# Patient Record
Sex: FEMALE | Race: WHITE | NOT HISPANIC OR LATINO | Employment: FULL TIME | URBAN - METROPOLITAN AREA
[De-identification: names, ages, dates, MRNs, and addresses within clinical notes are randomized per-mention and may not be internally consistent; named-entity substitution may affect disease eponyms.]

---

## 2017-04-16 ENCOUNTER — APPOINTMENT (OUTPATIENT)
Dept: RADIOLOGY | Facility: MEDICAL CENTER | Age: 52
End: 2017-04-16
Attending: EMERGENCY MEDICINE

## 2017-04-16 ENCOUNTER — HOSPITAL ENCOUNTER (OUTPATIENT)
Dept: RADIOLOGY | Facility: MEDICAL CENTER | Age: 52
End: 2017-04-16

## 2017-04-16 ENCOUNTER — HOSPITAL ENCOUNTER (EMERGENCY)
Facility: MEDICAL CENTER | Age: 52
End: 2017-04-16
Attending: EMERGENCY MEDICINE

## 2017-04-16 VITALS
OXYGEN SATURATION: 94 % | TEMPERATURE: 96.3 F | HEIGHT: 69 IN | WEIGHT: 280 LBS | RESPIRATION RATE: 16 BRPM | BODY MASS INDEX: 41.47 KG/M2 | HEART RATE: 100 BPM

## 2017-04-16 DIAGNOSIS — S12.200A: ICD-10-CM

## 2017-04-16 PROCEDURE — 700111 HCHG RX REV CODE 636 W/ 250 OVERRIDE (IP): Performed by: EMERGENCY MEDICINE

## 2017-04-16 PROCEDURE — 305948 HCHG GREEN TRAUMA ACT PRE-NOTIFY NO CC

## 2017-04-16 PROCEDURE — 99285 EMERGENCY DEPT VISIT HI MDM: CPT

## 2017-04-16 PROCEDURE — 96375 TX/PRO/DX INJ NEW DRUG ADDON: CPT

## 2017-04-16 PROCEDURE — 72141 MRI NECK SPINE W/O DYE: CPT

## 2017-04-16 PROCEDURE — 96374 THER/PROPH/DIAG INJ IV PUSH: CPT

## 2017-04-16 RX ORDER — ONDANSETRON 2 MG/ML
4 INJECTION INTRAMUSCULAR; INTRAVENOUS ONCE
Status: COMPLETED | OUTPATIENT
Start: 2017-04-16 | End: 2017-04-16

## 2017-04-16 RX ORDER — IBUPROFEN 600 MG/1
600 TABLET ORAL
Qty: 20 TAB | Refills: 0 | Status: SHIPPED | OUTPATIENT
Start: 2017-04-16

## 2017-04-16 RX ORDER — IBUPROFEN 200 MG
600 TABLET ORAL EVERY 6 HOURS PRN
COMMUNITY

## 2017-04-16 RX ORDER — HYDROCODONE BITARTRATE AND ACETAMINOPHEN 5; 325 MG/1; MG/1
1-2 TABLET ORAL EVERY 6 HOURS PRN
Qty: 20 TAB | Refills: 0 | Status: SHIPPED | OUTPATIENT
Start: 2017-04-16

## 2017-04-16 RX ADMIN — HYDROMORPHONE HYDROCHLORIDE 0.5 MG: 1 INJECTION, SOLUTION INTRAMUSCULAR; INTRAVENOUS; SUBCUTANEOUS at 18:56

## 2017-04-16 RX ADMIN — ONDANSETRON 4 MG: 2 INJECTION, SOLUTION INTRAMUSCULAR; INTRAVENOUS at 18:56

## 2017-04-16 ASSESSMENT — PAIN SCALES - GENERAL: PAINLEVEL_OUTOF10: 3

## 2017-04-16 NOTE — ED AVS SNAPSHOT
4/16/2017    Debra Ordoeñz  [address]  [place] [address]    Dear Debra:    [facility] wants to ensure your discharge home is safe and you or your loved ones have had all of your questions answered regarding your care after you leave the hospital.    Below is a list of resources and contact information should you have any questions regarding your hospital stay, follow-up instructions, or active medical symptoms.    Questions or Concerns Regarding… Contact   Medical Questions Related to Your Discharge  (7 days a week, 8am-5pm) Contact a Nurse Care Coordinator   [phone]   Medical Questions Not Related to Your Discharge  (24 hours a day / 7 days a week)  Contact the Nurse Health Line   [phone]    Medications or Discharge Instructions Refer to your discharge packet   or contact your [facility] Primary Care Provider   [phone]   Follow-up Appointment(s) Schedule your appointment via erento   or contact Scheduling [phone]   Billing Review your statement via erento  or contact Billing [phone]   Medical Records Review your records via erento   or contact Medical Records [phone]     You may receive a telephone call within two days of discharge. This call is to make certain you understand your discharge instructions and have the opportunity to have any questions answered. You can also easily access your medical information, test results and upcoming appointments via the erento free online health management tool. You can learn more and sign up at [employer]/erento. For assistance setting up your erento account, please call [phone].    Once again, we want to ensure your discharge home is safe and that you have a clear understanding of any next steps in your care. If you have any questions or concerns, please do not hesitate to contact us, we are here for you. Thank you for choosing [facility] for your healthcare needs.    Sincerely,    Your [facility] Healthcare Team

## 2017-04-16 NOTE — ED AVS SNAPSHOT
Home Care Instructions                                                                                                                Debra Ordoñez   MRN: [id number]    Department:  [facility], Emergency Dept   Date of Visit:  4/16/2017            [facility], Emergency Dept    [address]    [place] [address]    Phone:  [phone]      You were seen by     Bandar Bond M.D.      Your Diagnosis Was     Closed fracture of third cervical vertebra, unspecified fracture morphology, initial encounter ([facility])     S12.200A       These are the medications you received during your hospitalization from 04/16/2017 1729 to 04/16/2017 2004     Date/Time Order Dose Route Action    04/16/2017 1856 HYDROmorphone (DILAUDID) injection 0.5 mg 0.5 mg Intravenous Given    04/16/2017 1856 ondansetron (ZOFRAN) syringe/vial injection 4 mg 4 mg Intravenous Given      Follow-up Information     1. Follow up with Roly Alatorre M.D..    Specialty:  Neurosurgery    Contact information    [address]  [place] [address]  [phone]        Medication Information     Review all of your home medications and newly ordered medications with your primary doctor and/or pharmacist as soon as possible. Follow medication instructions as directed by your doctor and/or pharmacist.     Please keep your complete medication list with you and share with your physician. Update the information when medications are discontinued, doses are changed, or new medications (including over-the-counter products) are added; and carry medication information at all times in the event of emergency situations.               Medication List      START taking these medications        Instructions    Morning Afternoon Evening Bedtime    hydrocodone-acetaminophen 5-325 MG Tabs per tablet   Commonly known as:  NORCO        Take 1-2 Tabs by mouth every 6 hours as needed (for pain).   Dose:  1-2 Tab                          ASK  your doctor about these medications        Instructions    Morning Afternoon Evening Bedtime    * ibuprofen 200 MG Tabs   What changed:  Another medication with the same name was added. Make sure you understand how and when to take each.   Commonly known as:  MOTRIN   Ask about: Which instructions should I use?        Take 600 mg by mouth every 6 hours as needed for Mild Pain.   Dose:  600 mg                        * ibuprofen 600 MG Tabs   What changed:  You were already taking a medication with the same name, and this prescription was added. Make sure you understand how and when to take each.   Commonly known as:  MOTRIN   Ask about: Which instructions should I use?        Take 1 Tab by mouth 3 times a day, with meals.   Dose:  600 mg                        * Notice:  This list has 2 medication(s) that are the same as other medications prescribed for you. Read the directions carefully, and ask your doctor or other care provider to review them with you.         Where to Get Your Medications      You can get these medications from any pharmacy     Bring a paper prescription for each of these medications    - hydrocodone-acetaminophen 5-325 MG Tabs per tablet  - ibuprofen 600 MG Tabs            Procedures and tests performed during your visit     MR-CERVICAL SPINE-W/O    OUTSIDE IMAGES-CT CERVICAL SPINE    OUTSIDE IMAGES-CT HEAD    OUTSIDE IMAGES-CT THORACIC SPINE        Discharge Instructions       Vertebral Fracture    Collar while up and about--can take off at bedtime and in shower.  Recommend tylenol for pain.  May also use occasional ibuprofen on full stomach.  If you need something stronger than either of these, may use Norco.    A vertebral fracture is a break in one of the bones that make up the spine (vertebrae). The vertebrae are stacked on top of each other to form the spinal column. They support the body and protect the spinal cord. The vertebral column has an upper part (cervical spine), a middle part  (thoracic spine), and a lower part (lumbar spine). Most vertebral fractures occur in the thoracic spine or lumbar spine.  There are three main types of vertebral fractures:  · Flexion fracture. This happens when vertebrae collapse. Vertebrae can collapse:  · In the front (compression fracture). This type of fracture is common in people who have a condition that causes their bones to be weak and brittle (osteoporosis). The fracture can make a person lose height.  · In the front and back (axial burst fracture).  · Extension fracture. This happens when an external force pulls apart the vertebrae.  · Rotation fracture. This happens when the spine bends extremely in one direction. This type can cause a piece of a vertebra to break off (transverse process fracture) or move out of its normal position (fracture dislocation). This type of fracture has a high risk for spinal cord injury.  Vertebral fractures can range from mild to very severe. The most severe types are those that cause the broken bones to move out of place (unstable) and those that injure or press on the spinal cord.  CAUSES  This condition is usually caused by a forceful injury. This type of injury commonly results from:  · Car accidents.  · Falling or jumping from a great height.  · Collisions in contact sports.  · Violent acts, such as an assault or a gunshot wound.  RISK FACTORS  This injury is more likely to happen to people who:  · Have osteoporosis.  · Participate in contact sports.  · Are in situations that could result in falls or other violent injuries.  SYMPTOMS  Symptoms of this injury depend on the location and the type of fracture. The most common symptom is back pain that gets worse with movement. You may also have trouble standing or walking. If a fracture has damaged your spinal cord or is pressing on it, you may also have:  · Numbness.  · Tingling.  · Weakness.  · Loss of movement.  · Loss of bowel or bladder control.  DIAGNOSIS  This injury  may be diagnosed based on symptoms, medical history, and a physical exam. You may also have imaging tests to confirm the diagnosis. These may include:  · Spine X-ray.  · CT scan.  · MRI.  TREATMENT  Treatment for this injury depends on the type of fracture. If your fracture is stable and does not affect your spinal cord, it may heal with nonsurgical treatment, such as:  · Taking pain medicine.  · Wearing a cast or a brace.  · Doing physical therapy exercises.  If your vertebral fracture is unstable or it affects your spinal cord, you may need surgical treatment, such as:  · Laminectomy. This procedure involves removing the part of a vertebra that is pushing on the spinal cord (spinal decompression surgery). Bone fragments may also be removed.  · Spinal fusion. This procedure is used to stabilize an unstable fracture. Vertebrae may be joined together with a piece of bone from another part of your body (graft) and held in place with rods, plates, or screws.  · Vertebroplasty. In this procedure, bone cement is used to rebuild collapsed vertebrae.  HOME CARE INSTRUCTIONS  General Instructions  · Take medicines only as directed by your health care provider.  · Do not drive or operate heavy machinery while taking pain medicine.  · If directed, apply ice to the injured area:  ¨ Put ice in a plastic bag.  ¨ Place a towel between your skin and the bag.  ¨ Leave the ice on for 30 minutes every two hours at first. Then apply the ice as needed.  · Wear your neck brace or back brace as directed by your health care provider.  · Do not drink alcohol. Alcohol can interfere with your treatment.  · Keep all follow-up visits as directed by your health care provider. This is important. It can help to prevent permanent injury, disability, and long-lasting (chronic) pain.  Activity  · Stay in bed (on bed rest) only as directed by your health care provider. Being on bed rest for too long can make your condition worse.  · Return to your  normal activities as directed by your health care provider. Ask what activities are safe for you.  · Do exercises to improve motion and strength in your back (physical therapy), as recommended by your health care provider.    · Exercise regularly as directed by your health care provider.  SEEK MEDICAL CARE IF:  · You have a fever.  · You develop a cough that makes your pain worse.  · Your pain medicine is not helping.  · Your pain does not get better over time.  · You cannot return to your normal activities as planned or expected.  SEEK IMMEDIATE MEDICAL CARE IF:  · Your pain is very bad and it suddenly gets worse.  · You are unable to move any body part (paralysis) that is below the level of your injury.  · You have numbness, tingling, or weakness in any body part that is below the level of your injury.  · You cannot control your bladder or bowels.   Head Injury, Adult  You have received a head injury. It does not appear serious at this time. Headaches and vomiting are common following head injury. It should be easy to awaken from sleeping. Sometimes it is necessary for you to stay in the emergency department for a while for observation. Sometimes admission to the hospital may be needed. After injuries such as yours, most problems occur within the first 24 hours, but side effects may occur up to 7-10 days after the injury. It is important for you to carefully monitor your condition and contact your health care provider or seek immediate medical care if there is a change in your condition.  WHAT ARE THE TYPES OF HEAD INJURIES?  Head injuries can be as minor as a bump. Some head injuries can be more severe. More severe head injuries include:  · A jarring injury to the brain (concussion).  · A bruise of the brain (contusion). This mean there is bleeding in the brain that can cause swelling.  · A cracked skull (skull fracture).  · Bleeding in the brain that collects, clots, and forms a bump (hematoma).  WHAT CAUSES A  HEAD INJURY?  A serious head injury is most likely to happen to someone who is in a car wreck and is not wearing a seat belt. Other causes of major head injuries include bicycle or motorcycle accidents, sports injuries, and falls.  HOW ARE HEAD INJURIES DIAGNOSED?  A complete history of the event leading to the injury and your current symptoms will be helpful in diagnosing head injuries. Many times, pictures of the brain, such as CT or MRI are needed to see the extent of the injury. Often, an overnight hospital stay is necessary for observation.   WHEN SHOULD I SEEK IMMEDIATE MEDICAL CARE?   You should get help right away if:  · You have confusion or drowsiness.  · You feel sick to your stomach (nauseous) or have continued, forceful vomiting.  · You have dizziness or unsteadiness that is getting worse.  · You have severe, continued headaches not relieved by medicine. Only take over-the-counter or prescription medicines for pain, fever, or discomfort as directed by your health care provider.  · You do not have normal function of the arms or legs or are unable to walk.  · You notice changes in the black spots in the center of the colored part of your eye (pupil).  · You have a clear or bloody fluid coming from your nose or ears.  · You have a loss of vision.  During the next 24 hours after the injury, you must stay with someone who can watch you for the warning signs. This person should contact local emergency services (911 in the [place] if you have seizures, you become unconscious, or you are unable to wake up.  HOW CAN I PREVENT A HEAD INJURY IN THE FUTURE?  The most important factor for preventing major head injuries is avoiding motor vehicle accidents.  To minimize the potential for damage to your head, it is crucial to wear seat belts while riding in motor vehicles. Wearing helmets while bike riding and playing collision sports (like football) is also helpful. Also, avoiding dangerous activities around the house  will further help reduce your risk of head injury.   WHEN CAN I RETURN TO NORMAL ACTIVITIES AND ATHLETICS?  You should be reevaluated by your health care provider before returning to these activities. If you have any of the following symptoms, you should not return to activities or contact sports until 1 week after the symptoms have stopped:  · Persistent headache.  · Dizziness or vertigo.  · Poor attention and concentration.  · Confusion.  · Memory problems.  · Nausea or vomiting.  · Fatigue or tire easily.  · Irritability.  · Intolerant of bright lights or loud noises.  · Anxiety or depression.  · Disturbed sleep.  MAKE SURE YOU:   · Understand these instructions.  · Will watch your condition.  · Will get help right away if you are not doing well or get worse.     This information is not intended to replace advice given to you by your health care provider. Make sure you discuss any questions you have with your health care provider.     Document Released: 12/18/2006 Document Revised: 01/08/2016 Document Reviewed: 08/25/2014  [employer] Interactive Patient Education ©2016 [employer]    This information is not intended to replace advice given to you by your health care provider. Make sure you discuss any questions you have with your health care provider.     Document Released: 01/25/2006 Document Revised: 05/03/2016 Document Reviewed: 12/23/2015  [employer] Patient Education ©2016 [employer]            Patient Information     Patient Information    Following emergency treatment: all patient requiring follow-up care must return either to a private physician or a clinic if your condition worsens before you are able to obtain further medical attention, please return to the emergency room.     Billing Information    At [facility], we work to make the billing process streamlined for our patients.  Our Representatives are here to answer any questions you may have regarding your hospital bill.  If you have  insurance coverage and have supplied your insurance information to us, we will submit a claim to your insurer on your behalf.  Should you have any questions regarding your bill, we can be reached online or by phone as follows:  Online: You are able pay your bills online or live chat with our representatives about any billing questions you may have. We are here to help Monday - Friday from 8:00am to 7:30pm and 9:00am - 12:00pm on Saturdays.  Please visit [email or web address]  for more information.   Phone:  [phone] or [phone]    Please note that your emergency physician, surgeon, pathologist, radiologist, anesthesiologist, and other specialists are not employed by [facility] and will therefore bill separately for their services.  Please contact them directly for any questions concerning their bills at the numbers below:     Emergency Physician Services:  [phone]  [facility]:  [phone]  [facility]:  [phone]  [facility]:  [phone]    1. Your final bill may vary from the amount quoted upon discharge if all procedures are not complete at that time, or if your doctor has additional procedures of which we are not aware. You will receive an additional bill if you return to the Emergency Department at [facility] for suture removal regardless of the facility of which the sutures were placed.     2. Please arrange for settlement of this account at the emergency registration.    3. All self-pay accounts are due in full at the time of treatment.  If you are unable to meet this obligation then payment is expected within 4-5 days.     4. If you have had radiology studies (CT, X-ray, Ultrasound, MRI), you have received a preliminary result during your emergency department visit. Please contact the radiology department [phone] to receive a copy of your final result. Please discuss the Final result with your  primary physician or with the follow up physician provided.     Crisis Hotline:  [place] Crisis Hotline:  7-492-UEHBKQY or 1-594.119.4531  [place] Crisis Hotline:    1-785.907.5391 or 722-824-7546         ED Discharge Follow Up Questions    1. In order to provide you with very good care, we would like to follow up with a phone call in the next few days.  May we have your permission to contact you?     YES /  NO    2. What is the best phone number to call you? (       )_____-__________    3. What is the best time to call you?      Morning  /  Afternoon  /  Evening                   Patient Signature:  ____________________________________________________________    Date:  ____________________________________________________________

## 2017-04-16 NOTE — ED AVS SNAPSHOT
etouches Access Code: [email or web address]  Expires: 5/16/2017  8:04 PM    Your email address is not on file at [facility].  Email Addresses are required for you to sign up for etouches, please contact [phone] to verify your personal information and to provide your email address prior to attempting to register for etouches.    Debra Harrellelia  [address] [place] [address]    etouches  A secure, online tool to manage your health information     [facility]’s etouches® is a secure, online tool that connects you to your personalized health information from the privacy of your home -- day or night - making it very easy for you to manage your healthcare. Once the activation process is completed, you can even access your medical information using the etouches leigha, which is available for free in the Apple Leigha store or Google Play store.     To learn more about etouches, visit [email or web address]    There are two levels of access available (as shown below):   My Chart Features  [facility] Primary Care Doctor [facility]  Specialists [facility]  Urgent  Care Non-[facility] Primary Care Doctor   Email your healthcare team securely and privately 24/7 X X X    Manage appointments: schedule your next appointment; view details of past/upcoming appointments X      Request prescription refills. X      View recent personal medical records, including lab and immunizations X X X X   View health record, including health history, allergies, medications X X X X   Read reports about your outpatient visits, procedures, consult and ER notes X X X X   See your discharge summary, which is a recap of your hospital and/or ER visit that includes your diagnosis, lab results, and care plan X X  X     How to register for etouches:  Once your e-mail address has been verified, follow the following steps to sign up for etouches.     1. Go to  [email or web address]  2. Click on the Sign Up Now box, which takes you to the New Member Sign Up page. You will need  to provide the following information:  a. Enter your [facility] [employer] Access Code exactly as it appears at the top of this page. (You will not need to use this code after you’ve completed the sign-up process. If you do not sign up before the expiration date, you must request a new code.)   b. Enter your date of birth.   c. Enter your home email address.   d. Click Submit, and follow the next screen’s instructions.  3. Create a [facility] ID. This will be your [facility] login ID and cannot be changed, so think of one that is secure and easy to remember.  4. Create a [facility] [employer] password. You can change your password at any time.  5. Enter your Password Reset Question and Answer. This can be used at a later time if you forget your password.   6. Enter your e-mail address. This allows you to receive e-mail notifications when new information is available in [facility].  7. Click Sign Up. You can now view your health information.    For assistance activating your [facility] account, call [phone]

## 2017-04-17 NOTE — ED NOTES
Pt to room 19 at this time. Pt denies pain/needs. Placed on bp cuff and continuous pulse ox. Pts  to bedside.  Pt awaiting MRI.

## 2017-04-17 NOTE — ED PROVIDER NOTES
"ED Provider Note    CHIEF COMPLAINT  Chief Complaint   Patient presents with   • Trauma Green       [facility]  Sandra Lin-Nupur is a 51 y.o. female who presents in transfer for a teardrop fracture of C3. The patient was helping hold planted tree and was underneath a backhoe. The back lurched and struck her in the head knocking her over. She denies any loss of consciousness. She has facial pain where she was hit on the head but denies any headache. She denies any chest pain or shortness of breath. Initially volitional hard time holding her head up. She has numbness in both of her fingers. It is since gone but she still has discomfort across her shoulders. She denies any chest pain or shortness of breath. No other extremity weakness or numbness. No nausea or vomiting. There is no other complaint.    PAST MEDICAL HISTORY  None    FAMILY HISTORY  History reviewed. No pertinent family history.    SOCIAL HISTORY  Social History   Substance Use Topics   • Smoking status: Current Every Day Smoker -- 0.50 packs/day     Types: Cigarettes   • Smokeless tobacco: None   • Alcohol Use: No         SURGICAL HISTORY  History reviewed. No pertinent past surgical history.    CURRENT MEDICATIONS  Home Medications     Reviewed by Gianni Worthy R.N. (Registered Nurse) on 04/16/17 at 1741  Med List Status: Complete    Medication Last Dose Status          Patient Florentin Taking any Medications                        I have reviewed the nurses notes and/or the list brought with the patient.    ALLERGIES  No Known Allergies    REVIEW OF SYSTEMS  See HPI for further details. Review of systems as above, otherwise all other systems are negative.     PHYSICAL EXAM  VITAL SIGNS: Pulse 89  Temp(Src) 35.7 °C (96.3 °F)  Resp 16  Ht 1.753 m (5' 9\")  Wt 127.007 kg (280 lb)  BMI 41.33 kg/m2  SpO2 94%  Constitutional: Well appearing patient in no acute distress.  Not toxic, nor ill in appearance.  HENT: Mucus membranes moist.  Oropharynx is clear. She " is a contusion over her left forehead. No evidence of skull fracture.  Eyes: Pupils equally round.  No scleral icterus.   Neck: Cervical collar is maintained.  Lymphatic: No cervical lymphadenopathy noted.   Cardiovascular: Regular heart rate and rhythm.  No murmurs, rubs, nor gallop appreciated.   Thorax & Lungs: Chest is nontender.  Lungs are clear to auscultation with good air movement bilaterally.  No wheeze, rhonchi, nor rales.   Abdomen: Soft, with no tenderness, rebound nor guarding.  No mass, pulsatile mass, nor hepatosplenomegaly appreciated.  Skin: No purpura nor petechia noted.  Extremities/Musculoskeletal: No sign of trauma.  Calves are nontender with no cords nor edema.  No Francis's sign.  Pulses are intact all around.   Neurologic: Alert & oriented.  Strength and sensation is intact all around at this time.  Psychiatric: Normal affect appropriate for the clinical situation.    RADIOLOGY/PROCEDURES  I have reviewed the patient's film interpretations myself, and they are read out by the radiologist as:   OUTSIDE IMAGES-CT THORACIC SPINE   Preliminary Result      OUTSIDE IMAGES-CT CERVICAL SPINE   Preliminary Result      OUTSIDE IMAGES-CT HEAD   Preliminary Result      MR-CERVICAL SPINE-W/O    (Results Pending)     .    MEDICAL RECORD  I have reviewed patient's medical record and pertinent results are listed above.    COURSE & MEDICAL DECISION MAKING  I have reviewed any medical record information, laboratory studies and radiographic results as noted above.  Patient arrives after being struck in the head with some neck pain, and some transient numbness down to her hands. Workup at the outside hospital revealed a normal brain CT, and a cervical CT showing a teardrop fracture of C3. She is presently neurologically intact. The case is discussed with Dr. baca from neurosurgery who agreed with an MRI which I ordered. This shows that the aforementioned teardrop fracture but does not show any evidence of  abnormal spinal cord signal such as central cord syndrome. She does have a bulging disks at 65, however, she is a symptomatic from this. I discussed the patient's case with Dr. Buck from radiology and then again with Dr. baca who reviewed her MRI. He would like her to be discharged to follow-up with him in 2 weeks. He would like her to wear her cervical collar although she may remove this at nighttime and while in the shower. She gave instructions of vertebral fracture as well as head injury. Her  will be keeping an eye on her. She is discharged.      FINAL IMPRESSION  1. Closed fracture of third cervical vertebra, unspecified fracture morphology, initial encounter ([facility])     2. Head contusion       This dictation was created using voice recognition software.    Electronically signed by: Bandar Bond, 4/16/2017 5:54 PM

## 2017-04-17 NOTE — ED NOTES
Patient given discharge paperwork and verbalized understanding. Patient out of ED ambulatory with family. Patient in stable condition and vitals stable and no distress noted.

## 2017-04-17 NOTE — ED NOTES
MITA Koenig, transfer from [facility] for further eval of a teardrop C2 fx.  Pt was hit in the head by a backhoe.  Neg loc.  Pt c/o neck pain.  Pt in c spine.  Clark.  +cms.

## 2017-04-17 NOTE — PROGRESS NOTES
Cervical collar was delivered and fitted to patient.  If any further assistance needed, please call extension [phone] or place order for Ortho Technician assistance as a communication order in [employer].

## 2017-04-17 NOTE — DISCHARGE INSTRUCTIONS
Vertebral Fracture    Collar while up and about--can take off at bedtime and in shower.  Recommend tylenol for pain.  May also use occasional ibuprofen on full stomach.  If you need something stronger than either of these, may use Norco.    A vertebral fracture is a break in one of the bones that make up the spine (vertebrae). The vertebrae are stacked on top of each other to form the spinal column. They support the body and protect the spinal cord. The vertebral column has an upper part (cervical spine), a middle part (thoracic spine), and a lower part (lumbar spine). Most vertebral fractures occur in the thoracic spine or lumbar spine.  There are three main types of vertebral fractures:  · Flexion fracture. This happens when vertebrae collapse. Vertebrae can collapse:  · In the front (compression fracture). This type of fracture is common in people who have a condition that causes their bones to be weak and brittle (osteoporosis). The fracture can make a person lose height.  · In the front and back (axial burst fracture).  · Extension fracture. This happens when an external force pulls apart the vertebrae.  · Rotation fracture. This happens when the spine bends extremely in one direction. This type can cause a piece of a vertebra to break off (transverse process fracture) or move out of its normal position (fracture dislocation). This type of fracture has a high risk for spinal cord injury.  Vertebral fractures can range from mild to very severe. The most severe types are those that cause the broken bones to move out of place (unstable) and those that injure or press on the spinal cord.  CAUSES  This condition is usually caused by a forceful injury. This type of injury commonly results from:  · Car accidents.  · Falling or jumping from a great height.  · Collisions in contact sports.  · Violent acts, such as an assault or a gunshot wound.  RISK FACTORS  This injury is more likely to happen to people who:  · Have  osteoporosis.  · Participate in contact sports.  · Are in situations that could result in falls or other violent injuries.  SYMPTOMS  Symptoms of this injury depend on the location and the type of fracture. The most common symptom is back pain that gets worse with movement. You may also have trouble standing or walking. If a fracture has damaged your spinal cord or is pressing on it, you may also have:  · Numbness.  · Tingling.  · Weakness.  · Loss of movement.  · Loss of bowel or bladder control.  DIAGNOSIS  This injury may be diagnosed based on symptoms, medical history, and a physical exam. You may also have imaging tests to confirm the diagnosis. These may include:  · Spine X-ray.  · CT scan.  · MRI.  TREATMENT  Treatment for this injury depends on the type of fracture. If your fracture is stable and does not affect your spinal cord, it may heal with nonsurgical treatment, such as:  · Taking pain medicine.  · Wearing a cast or a brace.  · Doing physical therapy exercises.  If your vertebral fracture is unstable or it affects your spinal cord, you may need surgical treatment, such as:  · Laminectomy. This procedure involves removing the part of a vertebra that is pushing on the spinal cord (spinal decompression surgery). Bone fragments may also be removed.  · Spinal fusion. This procedure is used to stabilize an unstable fracture. Vertebrae may be joined together with a piece of bone from another part of your body (graft) and held in place with rods, plates, or screws.  · Vertebroplasty. In this procedure, bone cement is used to rebuild collapsed vertebrae.  HOME CARE INSTRUCTIONS  General Instructions  · Take medicines only as directed by your health care provider.  · Do not drive or operate heavy machinery while taking pain medicine.  · If directed, apply ice to the injured area:  ¨ Put ice in a plastic bag.  ¨ Place a towel between your skin and the bag.  ¨ Leave the ice on for 30 minutes every two hours at  first. Then apply the ice as needed.  · Wear your neck brace or back brace as directed by your health care provider.  · Do not drink alcohol. Alcohol can interfere with your treatment.  · Keep all follow-up visits as directed by your health care provider. This is important. It can help to prevent permanent injury, disability, and long-lasting (chronic) pain.  Activity  · Stay in bed (on bed rest) only as directed by your health care provider. Being on bed rest for too long can make your condition worse.  · Return to your normal activities as directed by your health care provider. Ask what activities are safe for you.  · Do exercises to improve motion and strength in your back (physical therapy), as recommended by your health care provider.    · Exercise regularly as directed by your health care provider.  SEEK MEDICAL CARE IF:  · You have a fever.  · You develop a cough that makes your pain worse.  · Your pain medicine is not helping.  · Your pain does not get better over time.  · You cannot return to your normal activities as planned or expected.  SEEK IMMEDIATE MEDICAL CARE IF:  · Your pain is very bad and it suddenly gets worse.  · You are unable to move any body part (paralysis) that is below the level of your injury.  · You have numbness, tingling, or weakness in any body part that is below the level of your injury.  · You cannot control your bladder or bowels.   Head Injury, Adult  You have received a head injury. It does not appear serious at this time. Headaches and vomiting are common following head injury. It should be easy to awaken from sleeping. Sometimes it is necessary for you to stay in the emergency department for a while for observation. Sometimes admission to the hospital may be needed. After injuries such as yours, most problems occur within the first 24 hours, but side effects may occur up to 7-10 days after the injury. It is important for you to carefully monitor your condition and contact your  health care provider or seek immediate medical care if there is a change in your condition.  WHAT ARE THE TYPES OF HEAD INJURIES?  Head injuries can be as minor as a bump. Some head injuries can be more severe. More severe head injuries include:  · A jarring injury to the brain (concussion).  · A bruise of the brain (contusion). This mean there is bleeding in the brain that can cause swelling.  · A cracked skull (skull fracture).  · Bleeding in the brain that collects, clots, and forms a bump (hematoma).  WHAT CAUSES A HEAD INJURY?  A serious head injury is most likely to happen to someone who is in a car wreck and is not wearing a seat belt. Other causes of major head injuries include bicycle or motorcycle accidents, sports injuries, and falls.  HOW ARE HEAD INJURIES DIAGNOSED?  A complete history of the event leading to the injury and your current symptoms will be helpful in diagnosing head injuries. Many times, pictures of the brain, such as CT or MRI are needed to see the extent of the injury. Often, an overnight hospital stay is necessary for observation.   WHEN SHOULD I SEEK IMMEDIATE MEDICAL CARE?   You should get help right away if:  · You have confusion or drowsiness.  · You feel sick to your stomach (nauseous) or have continued, forceful vomiting.  · You have dizziness or unsteadiness that is getting worse.  · You have severe, continued headaches not relieved by medicine. Only take over-the-counter or prescription medicines for pain, fever, or discomfort as directed by your health care provider.  · You do not have normal function of the arms or legs or are unable to walk.  · You notice changes in the black spots in the center of the colored part of your eye (pupil).  · You have a clear or bloody fluid coming from your nose or ears.  · You have a loss of vision.  During the next 24 hours after the injury, you must stay with someone who can watch you for the warning signs. This person should contact local  emergency services (911 in the [place] if you have seizures, you become unconscious, or you are unable to wake up.  HOW CAN I PREVENT A HEAD INJURY IN THE FUTURE?  The most important factor for preventing major head injuries is avoiding motor vehicle accidents.  To minimize the potential for damage to your head, it is crucial to wear seat belts while riding in motor vehicles. Wearing helmets while bike riding and playing collision sports (like football) is also helpful. Also, avoiding dangerous activities around the house will further help reduce your risk of head injury.   WHEN CAN I RETURN TO NORMAL ACTIVITIES AND ATHLETICS?  You should be reevaluated by your health care provider before returning to these activities. If you have any of the following symptoms, you should not return to activities or contact sports until 1 week after the symptoms have stopped:  · Persistent headache.  · Dizziness or vertigo.  · Poor attention and concentration.  · Confusion.  · Memory problems.  · Nausea or vomiting.  · Fatigue or tire easily.  · Irritability.  · Intolerant of bright lights or loud noises.  · Anxiety or depression.  · Disturbed sleep.  MAKE SURE YOU:   · Understand these instructions.  · Will watch your condition.  · Will get help right away if you are not doing well or get worse.     This information is not intended to replace advice given to you by your health care provider. Make sure you discuss any questions you have with your health care provider.     Document Released: 12/18/2006 Document Revised: 01/08/2016 Document Reviewed: 08/25/2014  [employer] Interactive Patient Education ©2016 [employer]    This information is not intended to replace advice given to you by your health care provider. Make sure you discuss any questions you have with your health care provider.     Document Released: 01/25/2006 Document Revised: 05/03/2016 Document Reviewed: 12/23/2015  [employer] Interactive Patient Education ©2016 [employer]

## 2017-04-18 ENCOUNTER — PATIENT OUTREACH (OUTPATIENT)
Dept: HEALTH INFORMATION MANAGEMENT | Facility: OTHER | Age: 52
End: 2017-04-18

## 2017-04-18 NOTE — PROGRESS NOTES
· 4/18/17 at 3:02 PM--Received phone call from pt's  Chaka.  Chaka is inquiring about sending pt's records to [facility].  Provided Chaka with phone number to medical records at [facility] and explained to him that pt  would have to sign a release in order to have her records sent to other medical providers.  Chaka verbalizes understanding.

## 2017-04-19 ENCOUNTER — APPOINTMENT (OUTPATIENT)
Dept: URGENT CARE | Facility: PHYSICIAN GROUP | Age: 52
End: 2017-04-19

## 2017-04-20 ENCOUNTER — PATIENT OUTREACH (OUTPATIENT)
Dept: HEALTH INFORMATION MANAGEMENT | Facility: OTHER | Age: 52
End: 2017-04-20

## 2017-04-20 NOTE — PROGRESS NOTES
· 4/20/17 at 10:45 AM--Received phone call from pt's  Chaka.  Pt was discharged from ER on 4/16/17.  Chaka states that pt has an appt with Dr. Alatorre but not until 5/11/17.  Chaka states that pt only has one pain pill left from Rx prescribed by ERP.  Pt is having systemic pain.  Provided Chaka with contact information for both [facility] and [facility] clinics.  Chaka verbalizes understanding and states that he will call and attempt to get appt for patient.    · 4/20/17 at 10:59 AM--Received phone call from pt's  Chaka.  Chaka states that he called both [facility] and [facility] clinics and state that neither clinic can see pt.  Informed Chaka that he would need to take patient back to ER to obtain any additional pain meds.  Chaka states he is familiar with location of [facility].  Also provided Chaka with address and driving directions to [facility].  Chaka verbalizes understanding.

## 2017-04-22 ENCOUNTER — HOSPITAL ENCOUNTER (EMERGENCY)
Facility: MEDICAL CENTER | Age: 52
End: 2017-04-22
Attending: EMERGENCY MEDICINE

## 2017-04-22 ENCOUNTER — APPOINTMENT (OUTPATIENT)
Dept: RADIOLOGY | Facility: MEDICAL CENTER | Age: 52
End: 2017-04-22
Attending: EMERGENCY MEDICINE

## 2017-04-22 VITALS
RESPIRATION RATE: 20 BRPM | TEMPERATURE: 98 F | OXYGEN SATURATION: 96 % | HEIGHT: 69 IN | HEART RATE: 81 BPM | SYSTOLIC BLOOD PRESSURE: 187 MMHG | BODY MASS INDEX: 40.13 KG/M2 | WEIGHT: 270.95 LBS | DIASTOLIC BLOOD PRESSURE: 88 MMHG

## 2017-04-22 DIAGNOSIS — S09.90XA CLOSED HEAD INJURY, INITIAL ENCOUNTER: ICD-10-CM

## 2017-04-22 PROCEDURE — 700111 HCHG RX REV CODE 636 W/ 250 OVERRIDE (IP): Performed by: EMERGENCY MEDICINE

## 2017-04-22 PROCEDURE — 70450 CT HEAD/BRAIN W/O DYE: CPT

## 2017-04-22 PROCEDURE — 96374 THER/PROPH/DIAG INJ IV PUSH: CPT

## 2017-04-22 PROCEDURE — 99284 EMERGENCY DEPT VISIT MOD MDM: CPT

## 2017-04-22 PROCEDURE — 96375 TX/PRO/DX INJ NEW DRUG ADDON: CPT

## 2017-04-22 RX ORDER — ONDANSETRON 2 MG/ML
4 INJECTION INTRAMUSCULAR; INTRAVENOUS ONCE
Status: COMPLETED | OUTPATIENT
Start: 2017-04-22 | End: 2017-04-22

## 2017-04-22 RX ORDER — DIPHENHYDRAMINE HYDROCHLORIDE 50 MG/ML
25 INJECTION INTRAMUSCULAR; INTRAVENOUS ONCE
Status: COMPLETED | OUTPATIENT
Start: 2017-04-22 | End: 2017-04-22

## 2017-04-22 RX ORDER — OXYCODONE HYDROCHLORIDE AND ACETAMINOPHEN 5; 325 MG/1; MG/1
1-2 TABLET ORAL EVERY 4 HOURS PRN
Qty: 20 TAB | Refills: 0 | Status: SHIPPED | OUTPATIENT
Start: 2017-04-22

## 2017-04-22 RX ADMIN — HYDROMORPHONE HYDROCHLORIDE 1 MG: 1 INJECTION, SOLUTION INTRAMUSCULAR; INTRAVENOUS; SUBCUTANEOUS at 08:06

## 2017-04-22 RX ADMIN — ONDANSETRON 4 MG: 2 INJECTION INTRAMUSCULAR; INTRAVENOUS at 08:07

## 2017-04-22 RX ADMIN — DIPHENHYDRAMINE HYDROCHLORIDE 25 MG: 50 INJECTION, SOLUTION INTRAMUSCULAR; INTRAVENOUS at 08:06

## 2017-04-22 ASSESSMENT — LIFESTYLE VARIABLES: DO YOU DRINK ALCOHOL: NO

## 2017-04-22 ASSESSMENT — PAIN SCALES - GENERAL: PAINLEVEL_OUTOF10: 2

## 2017-04-22 NOTE — ED PROVIDER NOTES
ED Provider Note    Scribed for Dony Pro D.O. by Joni Spaulding. 4/22/2017  7:41 AM    Primary care provider: Pcp Not In Computer  Means of arrival: Walk-in  History obtained from: Patient  History limited by: None    CHIEF COMPLAINT  Chief Complaint   Patient presents with   • Headache       HPI  Debra Ordoñez is a 51 y.o. female who presents to the Emergency Department with exacerbated neck pain and headache after being hit in the head by a backhoe tractor.  The patient was seen at [facility] on 4/16/17 where she was diagnosed with a C3 fracture.  She states that she has felt nauseous, but denies any associated vision changes, difficulty speaking or swallowing.  The pain in her head is localized and does not radiate.  She was unable to alleviate the pain at home which prompted her visit this morning.    REVIEW OF SYSTEMS  Pertinent positives include neck pain, headache, nausea. Pertinent negatives include no vision changes, difficulty speaking or swallowing.  All other systems reviewed and negative.    PAST MEDICAL HISTORY  Past Medical History   Diagnosis Date   • Obesity 5/30/2012   • Joint pain 5/30/2012   • GERD (gastroesophageal reflux disease) 5/30/2012   • Insomnia 5/30/2012   • Menopausal and perimenopausal disorder 5/30/2012       SURGICAL HISTORY  History reviewed. No pertinent past surgical history.     SOCIAL HISTORY  Social History   Substance Use Topics   • Smoking status: Former Smoker -- 0.20 packs/day     Types: Cigarettes     Quit date: 10/26/2012   • Smokeless tobacco: Never Used   • Alcohol Use: No      History   Drug Use No       FAMILY HISTORY  Family History   Problem Relation Age of Onset   • Other Mother      bipolar disorder   • Other Brother      suicide   • Other Son      suicide at age 15   • Cancer Father      pancreatic       CURRENT MEDICATIONS  No current facility-administered medications on file prior to encounter.     Current Outpatient  "Prescriptions on File Prior to Encounter   Medication Sig Dispense Refill   • gabapentin (NEURONTIN) 300 MG Cap Take 300 mg by mouth 3 times a day.     • predniSONE (DELTASONE) 20 MG Tab Take 2 tabs daily for 5 days. 10 Tab 0   • hydrocodone-acetaminophen (NORCO) 5-325 MG Tab per tablet Take 1-2 Tabs by mouth every 6 hours as needed. 20 Tab 0   • zolpidem (AMBIEN) 5 MG TABS Take 1 Tab by mouth at bedtime as needed for Sleep. 30 Tab 0   • citalopram (CELEXA) 20 MG TABS TAKE 1 TABLET BY MOUTH DAILY. 30 Tab 0   • topiramate (TOPAMAX) 25 MG TABS Take 1 Tab by mouth 2 times a day. 60 Tab 3   • phentermine 37.5 MG capsule Take 1 Cap by mouth every morning. 31 Cap 2       ALLERGIES  Allergies   Allergen Reactions   • Codeine        PHYSICAL EXAM  VITAL SIGNS: /88 mmHg  Pulse 81  Temp(Src) 36.7 °C (98 °F)  Resp 20  Ht 1.753 m (5' 9.02\")  Wt 122.9 kg (270 lb 15.1 oz)  BMI 39.99 kg/m2  SpO2 96%    Nursing notes and vitals reviewed.  Constitutional: Well developed, Well nourished, No acute distress, Non-toxic appearance.   Eyes: PERRLA, EOMI, Conjunctiva normal, No discharge.   Cervical spine: In a cervical collar  HENT:  Ecchymosis to the left temporal region.  Slight left periorbital ecchymosis.    Cardiovascular: Normal heart rate, Normal rhythm, No murmurs, No rubs, No gallops.   Thorax & Lungs: No respiratory distress, No rales, No rhonchi, No wheezing, No chest tenderness.   Skin: Warm, Dry, No erythema, No rash.   Musculoskeletal: Intact distal pulses, No edema, No cyanosis, No clubbing. Good range of motion in all major joints. No tenderness to palpation or major deformities noted, no CVA tenderness, no midline back tenderness.   Neurologic: Upper extremity strength 5/5 bilaterally, Sensation intact, DTR 2/4 to the upper extremities bilaterally.  Alert & oriented x 3, Normal motor function, Normal sensory function, No focal deficits noted.  Psychiatric: Affect normal for clinical " presentation.      DIAGNOSTIC STUDIES/PROCEDURES      RADIOLOGY  CT-HEAD W/O   Final Result      No acute intracranial findings.           The radiologist's interpretation of all radiological studies have been reviewed by me.    COURSE & MEDICAL DECISION MAKING  Pertinent Labs & Imaging studies reviewed. (See chart for details)    7:41 AM - Patient seen and examined at bedside. Patient will be treated with Dilaudid 1 mg, Benadryl 25 mg, Zofran 4 mg. Ordered a CT Head w/o to evaluate her symptoms.7:41 AM - Patient seen and examined at bedside. Patient will be treated with Dilaudid, Benadryl  8:25 AM - CT Head resulted negative for any acute intracranial findings.    This is a charming 51 y.o. female that presents with closed head injury. The patient was seen here previously boxing 4 days ago after having a backhoe hit her head resulting in a C3 fracture. The patient has no evidence of focal neurological vascular deficits. The patient had a CT scan of her brain completed secondary to possible intracranial hemorrhage. CT scan of the brain is negative for acute intracranial hemorrhage or significant inflammation.     A reevaluation, she states her pain is completely gone, she is very thankful she has no focal deficits. She'll be following up with the neurosurgery group within this week for reevaluation. I discussed secondary impact syndrome, post concussive syndrome and the need for follow-up. The patient will return for new or worsening symptoms and is stable at the time of discharge.    The patient is referred to a primary physician for blood pressure management, diabetic screening, and for all other preventative health concerns.    DISPOSITION:  Patient will be discharged home in stable condition.    FOLLOW UP:  [facility], Emergency Dept  [address] [address] [phone]    If symptoms worsen        FINAL IMPRESSION  1. Closed head injury, initial encounter     2. Headache  IJoni (Scribe), am scribing for, and in the presence of, Dony Pro D.O    Electronically signed by: Joni Spaulding (Radhaibyajaira), 4/22/2017    IDony D.O. personally performed the services described in this documentation, as scribed by Joni Spaulding in my presence, and it is both accurate and complete.    The note accurately reflects work and decisions made by me.  Dony Pro  4/22/2017  12:13 PM

## 2017-04-22 NOTE — ED AVS SNAPSHOT
4/22/2017    Debra Ordoñez  [address] [place] [address]    Dear Debra:    [facility] wants to ensure your discharge home is safe and you or your loved ones have had all of your questions answered regarding your care after you leave the hospital.    Below is a list of resources and contact information should you have any questions regarding your hospital stay, follow-up instructions, or active medical symptoms.    Questions or Concerns Regarding… Contact   Medical Questions Related to Your Discharge  (7 days a week, 8am-5pm) Contact a Nurse Care Coordinator   [phone]   Medical Questions Not Related to Your Discharge  (24 hours a day / 7 days a week)  Contact the Nurse Health Line   [phone]    Medications or Discharge Instructions Refer to your discharge packet   or contact your [facility] Primary Care Provider   [phone]   Follow-up Appointment(s) Schedule your appointment via Moxtra   or contact Scheduling [phone]   Billing Review your statement via Moxtra  or contact Billing [phone]   Medical Records Review your records via Moxtra   or contact Medical Records [phone]     You may receive a telephone call within two days of discharge. This call is to make certain you understand your discharge instructions and have the opportunity to have any questions answered. You can also easily access your medical information, test results and upcoming appointments via the Moxtra free online health management tool. You can learn more and sign up at [email or web address]. For assistance setting up your Moxtra account, please call [phone].    Once again, we want to ensure your discharge home is safe and that you have a clear understanding of any next steps in your care. If you have any questions or concerns, please do not hesitate to contact us, we are here for you. Thank you for choosing [facility] for your healthcare needs.    Sincerely,    Your [facility] Healthcare Team

## 2017-04-22 NOTE — ED AVS SNAPSHOT
ZALP Access Code: [email or web address]  Expires: 5/22/2017  9:26 AM    Your email address is not on file at [facility].  Email Addresses are required for you to sign up for ZALP, please contact [phone] to verify your personal information and to provide your email address prior to attempting to register for ZALP.    Debra Alessandra Smita  [address] [place] [address]    ZALP  A secure, online tool to manage your health information     [facility]’s ZALP® is a secure, online tool that connects you to your personalized health information from the privacy of your home -- day or night - making it very easy for you to manage your healthcare. Once the activation process is completed, you can even access your medical information using the ZALP leigha, which is available for free in the Apple Leigha store or Google Play store.     To learn more about ZALP, visit [email or web address]    There are two levels of access available (as shown below):   My Chart Features  [facility] Primary Care Doctor [facility]  Specialists [facility]  Urgent  Care Non-[facility] Primary Care Doctor   Email your healthcare team securely and privately 24/7 X X X    Manage appointments: schedule your next appointment; view details of past/upcoming appointments X      Request prescription refills. X      View recent personal medical records, including lab and immunizations X X X X   View health record, including health history, allergies, medications X X X X   Read reports about your outpatient visits, procedures, consult and ER notes X X X X   See your discharge summary, which is a recap of your hospital and/or ER visit that includes your diagnosis, lab results, and care plan X X  X     How to register for Micropeltt:  Once your e-mail address has been verified, follow the following steps to sign up for Micropeltt.     1. Go to  [email or web address]  2. Click on the Sign Up Now box, which takes you to the New Member Sign Up page. You  will need to provide the following information:  a. Enter your Mojix Access Code exactly as it appears at the top of this page. (You will not need to use this code after you’ve completed the sign-up process. If you do not sign up before the expiration date, you must request a new code.)   b. Enter your date of birth.   c. Enter your home email address.   d. Click Submit, and follow the next screen’s instructions.  3. Create a Fry Multimediat ID. This will be your Mojix login ID and cannot be changed, so think of one that is secure and easy to remember.  4. Create a Mojix password. You can change your password at any time.  5. Enter your Password Reset Question and Answer. This can be used at a later time if you forget your password.   6. Enter your e-mail address. This allows you to receive e-mail notifications when new information is available in Mojix.  7. Click Sign Up. You can now view your health information.    For assistance activating your Mojix account, call [phone]

## 2017-04-22 NOTE — ED AVS SNAPSHOT
Home Care Instructions                                                                                                                Debra Ordoñez   MRN: [id number]    Department:  [facility], Emergency Dept   Date of Visit:  4/22/2017            [facility], Emergency Dept    [address] [place] [address]    Phone:  [phone]      You were seen by     Dony Pro D.O.      Your Diagnosis Was     Closed head injury, initial encounter     S09.90XA       These are the medications you received during your hospitalization from 04/22/2017 0458 to 04/22/2017 0938     Date/Time Order Dose Route Action    04/22/2017 0806 HYDROmorphone (DILAUDID) injection 1 mg 1 mg Intravenous Given    04/22/2017 0806 diphenhydrAMINE (BENADRYL) injection 25 mg 25 mg Intravenous Given    04/22/2017 0807 ondansetron (ZOFRAN) syringe/vial injection 4 mg 4 mg Intravenous Given      Follow-up Information     1. Follow up with [facility], Emergency Dept.    Specialty:  Emergency Medicine    Why:  If symptoms worsen    Contact information    [address] [place] [id number] [phone]      Medication Information     Review all of your home medications and newly ordered medications with your primary doctor and/or pharmacist as soon as possible. Follow medication instructions as directed by your doctor and/or pharmacist.     Please keep your complete medication list with you and share with your physician. Update the information when medications are discontinued, doses are changed, or new medications (including over-the-counter products) are added; and carry medication information at all times in the event of emergency situations.               Medication List      ASK your doctor about these medications        Instructions    Morning Afternoon Evening Bedtime    citalopram 20 MG Tabs   Commonly known as:  CELEXA        Doctor's comments:  NEEDS  APPOINTMENT WITH PROVIDER BEFORE FURTHER REFILLS   TAKE 1 TABLET BY MOUTH DAILY.                        gabapentin 300 MG Caps   Commonly known as:  NEURONTIN        Take 300 mg by mouth 3 times a day.   Dose:  300 mg                        hydrocodone-acetaminophen 5-325 MG Tabs per tablet   Commonly known as:  NORCO        Take 1-2 Tabs by mouth every 6 hours as needed.   Dose:  1-2 Tab                        phentermine 37.5 MG capsule        Take 1 Cap by mouth every morning.   Dose:  37.5 mg                        predniSONE 20 MG Tabs   Commonly known as:  DELTASONE        Take 2 tabs daily for 5 days.                        topiramate 25 MG Tabs   Commonly known as:  TOPAMAX        Take 1 Tab by mouth 2 times a day.   Dose:  25 mg                        zolpidem 5 MG Tabs   Commonly known as:  AMBIEN        Doctor's comments:  No more refills without visit with provider   Take 1 Tab by mouth at bedtime as needed for Sleep.   Dose:  5 mg                                Procedures and tests performed during your visit     CT-HEAD W/O    IV Saline Lock        Discharge Instructions       Concussion, Adult  A concussion, or closed-head injury, is a brain injury caused by a direct blow to the head or by a quick and sudden movement (jolt) of the head or neck. Concussions are usually not life-threatening. Even so, the effects of a concussion can be serious. If you have had a concussion before, you are more likely to experience concussion-like symptoms after a direct blow to the head.   CAUSES  · Direct blow to the head, such as from running into another player during a soccer game, being hit in a fight, or hitting your head on a hard surface.  · A jolt of the head or neck that causes the brain to move back and forth inside the skull, such as in a car crash.  SIGNS AND SYMPTOMS  The signs of a concussion can be hard to notice. Early on, they may be missed by you, family members, and health care providers. You may look  fine but act or feel differently.  Symptoms are usually temporary, but they may last for days, weeks, or even longer. Some symptoms may appear right away while others may not show up for hours or days. Every head injury is different. Symptoms include:  · Mild to moderate headaches that will not go away.  · A feeling of pressure inside your head.  · Having more trouble than usual:  ¨ Learning or remembering things you have heard.  ¨ Answering questions.  ¨ Paying attention or concentrating.  ¨ Organizing daily tasks.  ¨ Making decisions and solving problems.  · Slowness in thinking, acting or reacting, speaking, or reading.  · Getting lost or being easily confused.  · Feeling tired all the time or lacking energy (fatigued).  · Feeling drowsy.  · Sleep disturbances.  ¨ Sleeping more than usual.  ¨ Sleeping less than usual.  ¨ Trouble falling asleep.  ¨ Trouble sleeping (insomnia).  · Loss of balance or feeling lightheaded or dizzy.  · Nausea or vomiting.  · Numbness or tingling.  · Increased sensitivity to:  ¨ Sounds.  ¨ Lights.  ¨ Distractions.  · Vision problems or eyes that tire easily.  · Diminished sense of taste or smell.  · Ringing in the ears.  · Mood changes such as feeling sad or anxious.  · Becoming easily irritated or angry for little or no reason.  · Lack of motivation.  · Seeing or hearing things other people do not see or hear (hallucinations).  DIAGNOSIS  Your health care provider can usually diagnose a concussion based on a description of your injury and symptoms. He or she will ask whether you passed out (lost consciousness) and whether you are having trouble remembering events that happened right before and during your injury.  Your evaluation might include:  · A brain scan to look for signs of injury to the brain. Even if the test shows no injury, you may still have a concussion.  · Blood tests to be sure other problems are not present.  TREATMENT  · Concussions are usually treated in an emergency  department, in urgent care, or at a clinic. You may need to stay in the hospital overnight for further treatment.  · Tell your health care provider if you are taking any medicines, including prescription medicines, over-the-counter medicines, and natural remedies. Some medicines, such as blood thinners (anticoagulants) and aspirin, may increase the chance of complications. Also tell your health care provider whether you have had alcohol or are taking illegal drugs. This information may affect treatment.  · Your health care provider will send you home with important instructions to follow.  · How fast you will recover from a concussion depends on many factors. These factors include how severe your concussion is, what part of your brain was injured, your age, and how healthy you were before the concussion.  · Most people with mild injuries recover fully. Recovery can take time. In general, recovery is slower in older persons. Also, persons who have had a concussion in the past or have other medical problems may find that it takes longer to recover from their current injury.  HOME CARE INSTRUCTIONS  General Instructions  · Carefully follow the directions your health care provider gave you.  · Only take over-the-counter or prescription medicines for pain, discomfort, or fever as directed by your health care provider.  · Take only those medicines that your health care provider has approved.  · Do not drink alcohol until your health care provider says you are well enough to do so. Alcohol and certain other drugs may slow your recovery and can put you at risk of further injury.  · If it is harder than usual to remember things, write them down.  · If you are easily distracted, try to do one thing at a time. For example, do not try to watch TV while fixing dinner.  · Talk with family members or close friends when making important decisions.  · Keep all follow-up appointments. Repeated evaluation of your symptoms is  recommended for your recovery.  · Watch your symptoms and tell others to do the same. Complications sometimes occur after a concussion. Older adults with a brain injury may have a higher risk of serious complications, such as a blood clot on the brain.  · Tell your teachers, school nurse, school counselor, , , or  about your injury, symptoms, and restrictions. Tell them about what you can or cannot do. They should watch for:  ¨ Increased problems with attention or concentration.  ¨ Increased difficulty remembering or learning new information.  ¨ Increased time needed to complete tasks or assignments.  ¨ Increased irritability or decreased ability to cope with stress.  ¨ Increased symptoms.  · Rest. Rest helps the brain to heal. Make sure you:  ¨ Get plenty of sleep at night. Avoid staying up late at night.  ¨ Keep the same bedtime hours on weekends and weekdays.  ¨ Rest during the day. Take daytime naps or rest breaks when you feel tired.  · Limit activities that require a lot of thought or concentration. These include:  ¨ Doing homework or job-related work.  ¨ Watching TV.  ¨ Working on the computer.  · Avoid any situation where there is potential for another head injury (football, hockey, soccer, basketball, martial arts, downhill snow sports and horseback riding). Your condition will get worse every time you experience a concussion. You should avoid these activities until you are evaluated by the appropriate follow-up health care providers.  Returning To Your Regular Activities  You will need to return to your normal activities slowly, not all at once. You must give your body and brain enough time for recovery.  · Do not return to sports or other athletic activities until your health care provider tells you it is safe to do so.  · Ask your health care provider when you can drive, ride a bicycle, or operate heavy machinery. Your ability to react may be slower after a brain injury.  Never do these activities if you are dizzy.  · Ask your health care provider about when you can return to work or school.  Preventing Another Concussion  It is very important to avoid another brain injury, especially before you have recovered. In rare cases, another injury can lead to permanent brain damage, brain swelling, or death. The risk of this is greatest during the first 7-10 days after a head injury. Avoid injuries by:  · Wearing a seat belt when riding in a car.  · Drinking alcohol only in moderation.  · Wearing a helmet when biking, skiing, skateboarding, skating, or doing similar activities.  · Avoiding activities that could lead to a second concussion, such as contact or recreational sports, until your health care provider says it is okay.  · Taking safety measures in your home.  ¨ Remove clutter and tripping hazards from floors and stairways.  ¨ Use grab bars in bathrooms and handrails by stairs.  ¨ Place non-slip mats on floors and in bathtubs.  ¨ Improve lighting in dim areas.  SEEK MEDICAL CARE IF:  · You have increased problems paying attention or concentrating.  · You have increased difficulty remembering or learning new information.  · You need more time to complete tasks or assignments than before.  · You have increased irritability or decreased ability to cope with stress.  · You have more symptoms than before.  Seek medical care if you have any of the following symptoms for more than 2 weeks after your injury:  · Lasting (chronic) headaches.  · Dizziness or balance problems.  · Nausea.  · Vision problems.  · Increased sensitivity to noise or light.  · Depression or mood swings.  · Anxiety or irritability.  · Memory problems.  · Difficulty concentrating or paying attention.  · Sleep problems.  · Feeling tired all the time.  SEEK IMMEDIATE MEDICAL CARE IF:  · You have severe or worsening headaches. These may be a sign of a blood clot in the brain.  · You have weakness (even if only in one hand,  leg, or part of the face).  · You have numbness.  · You have decreased coordination.  · You vomit repeatedly.  · You have increased sleepiness.  · One pupil is larger than the other.  · You have convulsions.  · You have slurred speech.  · You have increased confusion. This may be a sign of a blood clot in the brain.  · You have increased restlessness, agitation, or irritability.  · You are unable to recognize people or places.  · You have neck pain.  · It is difficult to wake you up.  · You have unusual behavior changes.  · You lose consciousness.  MAKE SURE YOU:  · Understand these instructions.  · Will watch your condition.  · Will get help right away if you are not doing well or get worse.     This information is not intended to replace advice given to you by your health care provider. Make sure you discuss any questions you have with your health care provider.     Document Released: 03/09/2005 Document Revised: 01/08/2016 Document Reviewed: 07/10/2014  [employer] Interactive Patient Education ©2016 [employer]            Patient Information     Patient Information    Following emergency treatment: all patient requiring follow-up care must return either to a private physician or a clinic if your condition worsens before you are able to obtain further medical attention, please return to the emergency room.     Billing Information    At [facility], we work to make the billing process streamlined for our patients.  Our Representatives are here to answer any questions you may have regarding your hospital bill.  If you have insurance coverage and have supplied your insurance information to us, we will submit a claim to your insurer on your behalf.  Should you have any questions regarding your bill, we can be reached online or by phone as follows:  Online: You are able pay your bills online or live chat with our representatives about any billing questions you may have. We are here to help Monday - Friday from 8:00am  to 7:30pm and 9:00am - 12:00pm on Saturdays.  Please visit [email or web address]  for more information.   Phone:  [phone] or [phone]    Please note that your emergency physician, surgeon, pathologist, radiologist, anesthesiologist, and other specialists are not employed by [facility] and will therefore bill separately for their services.  Please contact them directly for any questions concerning their bills at the numbers below:     Emergency Physician Services:  [phone]  [facility]:  [phone]  [facility]:  [phone]  [facility]:  [phone]    1. Your final bill may vary from the amount quoted upon discharge if all procedures are not complete at that time, or if your doctor has additional procedures of which we are not aware. You will receive an additional bill if you return to the Emergency [facility] at [facility] for [facility] regardless of the facility of which the sutures were placed.     2. Please arrange for settlement of this account at the emergency registration.    3. All self-pay accounts are due in full at the time of treatment.  If you are unable to meet this obligation then payment is expected within 4-5 days.     4. If you have had radiology studies (CT, X-ray, Ultrasound, MRI), you have received a preliminary result during your emergency department visit. Please contact the radiology department [phone] to receive a copy of your final result. Please discuss the Final result with your primary physician or with the follow up physician provided.     Crisis Hotline:  [place] Crisis Hotline:  4-823-KJHSJNQ or 1-602.931.3108  [place] Crisis Hotline:    1-910.706.1845 or 605-254-6397         ED Discharge Follow Up Questions    1. In order to provide you with very good care, we would like to follow up with a phone call in the next few days.  May we have your permission to contact you?      YES /  NO    2. What is the best phone number to call you? (       )_____-__________    3. What is the best time to call you?      Morning  /  Afternoon  /  Evening                   Patient Signature:  ____________________________________________________________    Date:  ____________________________________________________________

## 2017-04-22 NOTE — DISCHARGE INSTRUCTIONS
Concussion, Adult  A concussion, or closed-head injury, is a brain injury caused by a direct blow to the head or by a quick and sudden movement (jolt) of the head or neck. Concussions are usually not life-threatening. Even so, the effects of a concussion can be serious. If you have had a concussion before, you are more likely to experience concussion-like symptoms after a direct blow to the head.   CAUSES  · Direct blow to the head, such as from running into another player during a soccer game, being hit in a fight, or hitting your head on a hard surface.  · A jolt of the head or neck that causes the brain to move back and forth inside the skull, such as in a car crash.  SIGNS AND SYMPTOMS  The signs of a concussion can be hard to notice. Early on, they may be missed by you, family members, and health care providers. You may look fine but act or feel differently.  Symptoms are usually temporary, but they may last for days, weeks, or even longer. Some symptoms may appear right away while others may not show up for hours or days. Every head injury is different. Symptoms include:  · Mild to moderate headaches that will not go away.  · A feeling of pressure inside your head.  · Having more trouble than usual:  ¨ Learning or remembering things you have heard.  ¨ Answering questions.  ¨ Paying attention or concentrating.  ¨ Organizing daily tasks.  ¨ Making decisions and solving problems.  · Slowness in thinking, acting or reacting, speaking, or reading.  · Getting lost or being easily confused.  · Feeling tired all the time or lacking energy (fatigued).  · Feeling drowsy.  · Sleep disturbances.  ¨ Sleeping more than usual.  ¨ Sleeping less than usual.  ¨ Trouble falling asleep.  ¨ Trouble sleeping (insomnia).  · Loss of balance or feeling lightheaded or dizzy.  · Nausea or vomiting.  · Numbness or tingling.  · Increased sensitivity to:  ¨ Sounds.  ¨ Lights.  ¨ Distractions.  · Vision problems or eyes that tire  easily.  · Diminished sense of taste or smell.  · Ringing in the ears.  · Mood changes such as feeling sad or anxious.  · Becoming easily irritated or angry for little or no reason.  · Lack of motivation.  · Seeing or hearing things other people do not see or hear (hallucinations).  DIAGNOSIS  Your health care provider can usually diagnose a concussion based on a description of your injury and symptoms. He or she will ask whether you passed out (lost consciousness) and whether you are having trouble remembering events that happened right before and during your injury.  Your evaluation might include:  · A brain scan to look for signs of injury to the brain. Even if the test shows no injury, you may still have a concussion.  · Blood tests to be sure other problems are not present.  TREATMENT  · Concussions are usually treated in an emergency department, in urgent care, or at a clinic. You may need to stay in the hospital overnight for further treatment.  · Tell your health care provider if you are taking any medicines, including prescription medicines, over-the-counter medicines, and natural remedies. Some medicines, such as blood thinners (anticoagulants) and aspirin, may increase the chance of complications. Also tell your health care provider whether you have had alcohol or are taking illegal drugs. This information may affect treatment.  · Your health care provider will send you home with important instructions to follow.  · How fast you will recover from a concussion depends on many factors. These factors include how severe your concussion is, what part of your brain was injured, your age, and how healthy you were before the concussion.  · Most people with mild injuries recover fully. Recovery can take time. In general, recovery is slower in older persons. Also, persons who have had a concussion in the past or have other medical problems may find that it takes longer to recover from their current injury.  HOME  CARE INSTRUCTIONS  General Instructions  · Carefully follow the directions your health care provider gave you.  · Only take over-the-counter or prescription medicines for pain, discomfort, or fever as directed by your health care provider.  · Take only those medicines that your health care provider has approved.  · Do not drink alcohol until your health care provider says you are well enough to do so. Alcohol and certain other drugs may slow your recovery and can put you at risk of further injury.  · If it is harder than usual to remember things, write them down.  · If you are easily distracted, try to do one thing at a time. For example, do not try to watch TV while fixing dinner.  · Talk with family members or close friends when making important decisions.  · Keep all follow-up appointments. Repeated evaluation of your symptoms is recommended for your recovery.  · Watch your symptoms and tell others to do the same. Complications sometimes occur after a concussion. Older adults with a brain injury may have a higher risk of serious complications, such as a blood clot on the brain.  · Tell your teachers, school nurse, school counselor, , , or  about your injury, symptoms, and restrictions. Tell them about what you can or cannot do. They should watch for:  ¨ Increased problems with attention or concentration.  ¨ Increased difficulty remembering or learning new information.  ¨ Increased time needed to complete tasks or assignments.  ¨ Increased irritability or decreased ability to cope with stress.  ¨ Increased symptoms.  · Rest. Rest helps the brain to heal. Make sure you:  ¨ Get plenty of sleep at night. Avoid staying up late at night.  ¨ Keep the same bedtime hours on weekends and weekdays.  ¨ Rest during the day. Take daytime naps or rest breaks when you feel tired.  · Limit activities that require a lot of thought or concentration. These include:  ¨ Doing homework or job-related  work.  ¨ Watching TV.  ¨ Working on the computer.  · Avoid any situation where there is potential for another head injury (football, hockey, soccer, basketball, martial arts, downhill snow sports and horseback riding). Your condition will get worse every time you experience a concussion. You should avoid these activities until you are evaluated by the appropriate follow-up health care providers.  Returning To Your Regular Activities  You will need to return to your normal activities slowly, not all at once. You must give your body and brain enough time for recovery.  · Do not return to sports or other athletic activities until your health care provider tells you it is safe to do so.  · Ask your health care provider when you can drive, ride a bicycle, or operate heavy machinery. Your ability to react may be slower after a brain injury. Never do these activities if you are dizzy.  · Ask your health care provider about when you can return to work or school.  Preventing Another Concussion  It is very important to avoid another brain injury, especially before you have recovered. In rare cases, another injury can lead to permanent brain damage, brain swelling, or death. The risk of this is greatest during the first 7-10 days after a head injury. Avoid injuries by:  · Wearing a seat belt when riding in a car.  · Drinking alcohol only in moderation.  · Wearing a helmet when biking, skiing, skateboarding, skating, or doing similar activities.  · Avoiding activities that could lead to a second concussion, such as contact or recreational sports, until your health care provider says it is okay.  · Taking safety measures in your home.  ¨ Remove clutter and tripping hazards from floors and stairways.  ¨ Use grab bars in bathrooms and handrails by stairs.  ¨ Place non-slip mats on floors and in bathtubs.  ¨ Improve lighting in dim areas.  SEEK MEDICAL CARE IF:  · You have increased problems paying attention or  concentrating.  · You have increased difficulty remembering or learning new information.  · You need more time to complete tasks or assignments than before.  · You have increased irritability or decreased ability to cope with stress.  · You have more symptoms than before.  Seek medical care if you have any of the following symptoms for more than 2 weeks after your injury:  · Lasting (chronic) headaches.  · Dizziness or balance problems.  · Nausea.  · Vision problems.  · Increased sensitivity to noise or light.  · Depression or mood swings.  · Anxiety or irritability.  · Memory problems.  · Difficulty concentrating or paying attention.  · Sleep problems.  · Feeling tired all the time.  SEEK IMMEDIATE MEDICAL CARE IF:  · You have severe or worsening headaches. These may be a sign of a blood clot in the brain.  · You have weakness (even if only in one hand, leg, or part of the face).  · You have numbness.  · You have decreased coordination.  · You vomit repeatedly.  · You have increased sleepiness.  · One pupil is larger than the other.  · You have convulsions.  · You have slurred speech.  · You have increased confusion. This may be a sign of a blood clot in the brain.  · You have increased restlessness, agitation, or irritability.  · You are unable to recognize people or places.  · You have neck pain.  · It is difficult to wake you up.  · You have unusual behavior changes.  · You lose consciousness.  MAKE SURE YOU:  · Understand these instructions.  · Will watch your condition.  · Will get help right away if you are not doing well or get worse.     This information is not intended to replace advice given to you by your health care provider. Make sure you discuss any questions you have with your health care provider.     Document Released: 03/09/2005 Document Revised: 01/08/2016 Document Reviewed: 07/10/2014  [employer] Interactive Patient Education ©2016 [employer]
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
## 2018-01-28 ENCOUNTER — OFFICE VISIT (OUTPATIENT)
Dept: URGENT CARE | Facility: PHYSICIAN GROUP | Age: 53
End: 2018-01-28
Payer: OTHER MISCELLANEOUS

## 2018-01-28 VITALS
OXYGEN SATURATION: 94 % | TEMPERATURE: 98.3 F | HEART RATE: 88 BPM | WEIGHT: 264.8 LBS | SYSTOLIC BLOOD PRESSURE: 130 MMHG | RESPIRATION RATE: 16 BRPM | HEIGHT: 69 IN | DIASTOLIC BLOOD PRESSURE: 84 MMHG | BODY MASS INDEX: 39.22 KG/M2

## 2018-01-28 DIAGNOSIS — R05.9 COUGH: ICD-10-CM

## 2018-01-28 PROCEDURE — 99213 OFFICE O/P EST LOW 20 MIN: CPT | Performed by: PHYSICIAN ASSISTANT

## 2018-01-29 NOTE — PROGRESS NOTES
Chief Complaint   Patient presents with   • Cough       HISTORY OF PRESENT ILLNESS: Patient is a 52 y.o. female who presents today for the following:    Cough x 2-3 days  Denies sputum production, fever, SOB, ST, nasal congestion, ear pain  Gastric sleeve in [place] 1 week ago  OTC meds: none  Taking rx meds from [place]       Patient Active Problem List    Diagnosis Date Noted   • Back pain 09/27/2013   • Obesity 05/30/2012   • GERD (gastroesophageal reflux disease) 05/30/2012   • Insomnia 05/30/2012   • Menopausal and perimenopausal disorder 05/30/2012       Allergies:Codeine    Current Outpatient Prescriptions Ordered in [facility]   Medication Sig Dispense Refill   • oxycodone-acetaminophen (PERCOCET) 5-325 MG Tab Take 1-2 Tabs by mouth every four hours as needed. 20 Tab 0   • ibuprofen (MOTRIN) 200 MG Tab Take 600 mg by mouth every 6 hours as needed for Mild Pain.     • ibuprofen (MOTRIN) 600 MG Tab Take 1 Tab by mouth 3 times a day, with meals. 20 Tab 0   • hydrocodone-acetaminophen (NORCO) 5-325 MG Tab per tablet Take 1-2 Tabs by mouth every 6 hours as needed (for pain). 20 Tab 0   • gabapentin (NEURONTIN) 300 MG Cap Take 300 mg by mouth 3 times a day.     • predniSONE (DELTASONE) 20 MG Tab Take 2 tabs daily for 5 days. 10 Tab 0   • hydrocodone-acetaminophen (NORCO) 5-325 MG Tab per tablet Take 1-2 Tabs by mouth every 6 hours as needed. 20 Tab 0   • zolpidem (AMBIEN) 5 MG TABS Take 1 Tab by mouth at bedtime as needed for Sleep. 30 Tab 0   • citalopram (CELEXA) 20 MG TABS TAKE 1 TABLET BY MOUTH DAILY. 30 Tab 0   • topiramate (TOPAMAX) 25 MG TABS Take 1 Tab by mouth 2 times a day. 60 Tab 3   • phentermine 37.5 MG capsule Take 1 Cap by mouth every morning. 31 Cap 2     No current Epic-ordered facility-administered medications on file.        Past Medical History:   Diagnosis Date   • GERD (gastroesophageal reflux disease) 5/30/2012   • Insomnia 5/30/2012   • Joint pain 5/30/2012   • Menopausal and perimenopausal  "disorder 5/30/2012   • Obesity 5/30/2012       Social History   Substance Use Topics   • Smoking status: Former Smoker     Packs/day: 0.20     Types: Cigarettes     Quit date: 10/26/2012   • Smokeless tobacco: Never Used   • Alcohol use No       No family status information on file.     Family History   Problem Relation Age of Onset   • Other Mother      bipolar disorder   • Other Brother      suicide   • Other Son      suicide at age 15   • Cancer Father      pancreatic       ROS:    Review of Systems   Constitutional: Negative for fever, chills, weight loss and malaise/fatigue.   HENT: Negative for ear pain, nosebleeds, congestion, sore throat and neck pain.    Eyes: Negative for blurred vision.   Respiratory: Negative for sputum production, shortness of breath and wheezing.    Cardiovascular: Negative for chest pain, palpitations, orthopnea and leg swelling.   Gastrointestinal: Negative for heartburn, nausea, vomiting and abdominal pain.   Genitourinary: Negative for dysuria, urgency and frequency.       Exam:  Blood pressure 130/84, pulse 88, temperature 36.8 °C (98.3 °F), resp. rate 16, height 1.753 m (5' 9\"), weight 120.1 kg (264 lb 12.8 oz), SpO2 94 %.  General: Well developed, well nourished. No distress.  HEENT: Conjunctiva clear, lids without ptosis, PERRL/EOMI. Ears normal shape and contour, canals are clear bilaterally, tympanic membranes are benign. Nasal mucosa benign. Oropharynx is without erythema, edema or exudates. Moist mucous membranes.  Pulmonary: Clear to ausculation and percussion.  Normal effort. No rales, ronchi, or wheezing.   Cardiovascular: Regular rate and rhythm without murmur. No edema.   Neurologic: Grossly nonfocal.  Lymph: No cervical lymphadenopathy noted.  Skin: Warm, dry, good turgor. No rashes in visible areas.   Psych: Normal mood. Alert and oriented x3. Judgment and insight is normal.    Assessment/Plan:  Discussed likely viral etiology or irritation from being intubated. " Discussed appropriate over-the-counter symptomatic medication, and when to return to clinic. Follow-up for worsening or persistent symptoms.   1. Cough